# Patient Record
(demographics unavailable — no encounter records)

---

## 2025-05-21 NOTE — HISTORY OF PRESENT ILLNESS
[FreeTextEntry1] : Andra Crandall is a 70 year old female with medical history significant for thyroid disease presenting today referred by PCP.  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 Labs 4/28/25 - HbA1c 6.0% H. B12 966, TSH 3.03, T4 1.3, folate 6.3, vitamin D 35.8  Her father passed away from ruptured brain aneurysm at 53 years old.  She was with him when he passed from the.  They were also told by the medical providers at the hospital that he had multiple aneurysms in different vessels in the head and neck.  She intermittently gets instances of slurred speech, very sporadic, typically only happens at work or if she is in a high stress situations such as public speaking.  Otherwise she does not get any speech abnormalities at home or in comfortable situations.  She denies any focal weakness, numbness or tingling, vertigo, droopy face, difficulty with gait or balance, or other strokelike symptoms.  She never smoked tobacco.  She did grow up in a household where she was exposed to a lot of secondhand smoke.  She is up-to-date with all cancer screenings, within normal limits.

## 2025-05-21 NOTE — PHYSICAL EXAM
[FreeTextEntry1] : NEURO: Mental Status Oriented to person, place, time, and situation Speech is clear, fluent, and spontaneous. Comprehension and memory intact.   Cranial Nerves Visual fields full to confrontation Pupils equal, round, reactive to light. Extraocular movements intact. No nystagmus or ptosis. Facial sensation intact and symmetric in V1, V2, V3 Facial movement intact and symmetric Uvula midline, soft palate elevates normally Bilateral shoulder shrug intact Tongue midline, no tongue bite sign or deviation on protrusion   Motor Tone and bulk intact Shoulder abduction: 5/5 b/l Elbow flexion/extension: 5/5 bl Hand : 5/5 b/l Hip flexion/extension: 5/5 b/l Knee flexion/extension: 5/5 b/l Dorsiflexion/plantar flexion: 5/5 b/l No pronator drift   Sensation Light touch grossly intact   Deep Tendon Reflexes Biceps 2+ b/l Patellar 2+ b/l   Coordination Normal finger to nose bilaterally No past pointing   Gait Normal stance, stride, and pivot turn    GEN: awake, alert, interactive, no acute distress EYES: sclera white, conjunctiva clear, no redness or discharge ENT: normal appearing outer ears, hearing grossly intact PULM: normal respiratory rhythm and effort, speaking in full sentences without distress, no accessory muscle usage EXT: moving all extremities spontaneously, no edema, no cyanosis SKIN: warm, dry

## 2025-05-21 NOTE — ASSESSMENT
[FreeTextEntry1] : 70 year old female with medical history significant for thyroid disease presenting today referred by PCP. FMHx of ruptured intracranial aneurysm - father passed away at 54 y/o. She has intermittent slurring of words with stressful situations/public speaking.  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 Labs 4/28/25 - HbA1c 6.0% H. B12 966, TSH 3.03, T4 1.3, folate 6.3, vitamin D 35.8 No focal neuro deficit on exam.  Discussed with pt that white matter changes on MRI are WNL for her age. She should control risk factors including BP, cholesterol, high blood sugars/preDM. She says today's BP readings in office 130-140s/80s are higher than her normal, I encouraged pt to get OTC BP cuff, check 2-3x daily and PRN with symptoms, keep journal and f/u with PCP or cardiology for management. We will get MRA H/N to evaluate for aneurysm or other vessel abnormalities.   Patient to return to office after testing, or sooner if needed. Patient understands to seek urgent medical evaluation for any new or worsening symptoms.

## 2025-06-18 NOTE — PHYSICAL EXAM
[FreeTextEntry1] : NEURO: Mental Status Oriented to person, place, time, and situation Speech is clear, fluent, and spontaneous. Comprehension and memory intact   Cranial Nerves Visual fields full to confrontation Pupils equal, round. Extraocular movements intact. No nystagmus or ptosis. Facial movement intact and symmetric Uvula midline, soft palate elevates normally Bilateral shoulder shrug intact Tongue midline, no tongue bite sign or deviation on protrusion   Motor Tone and bulk intact Shoulder abduction: 5/5 b/l Elbow flexion/extension: 5/5 bl Hand : 5/5 b/l Hip flexion/extension: 5/5 b/l Knee flexion/extension: 5/5 b/l Dorsiflexion/plantar flexion: 5/5 b/l No pronator drift   Sensation Light touch grossly intact No extinction     Coordination Normal finger to nose bilaterally No past pointing No tremor appreciated.   Gait Normal stance, stride, and pivot turn Tandem walk intact Heel and toe gait intact. Negative Romberg.     GEN: awake, alert, interactive, no acute distress EYES: sclera white, conjunctiva clear, no redness or discharge ENT: normal appearing outer ears, hearing grossly intact PULM: normal respiratory rhythm and effort, speaking in full sentences without distress, no accessory muscle usage EXT: moving all extremities spontaneously, no edema, no cyanosis SKIN: warm, dry, no lesions on visualized skin

## 2025-06-18 NOTE — HISTORY OF PRESENT ILLNESS
[FreeTextEntry1] : Andra Crandall is a 70 year old female with medical history significant for thyroid disease, FMHx of ruptured intracranial aneurysm presenting today referred by AIDEE Lyles.   She feels well, denies any neurological symptoms at present time.   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 There is no len aneurysm. MRA neck 5/27/25 - Normal MRA of the neck, no stenosis. Discussed results with pt. She would like evaluation with neurovascular specialist - she is concerned about these findings given her family history of ruptured aneurysms. We arrange for consultation with Dr. Patel. She would also like to bring BP machine and journal/readings. All questions answered to best of my ability.   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 Labs 4/28/25 - HbA1c 6.0% H. B12 966, TSH 3.03, T4 1.3, folate 6.3, vitamin D 35.8 Her father passed away from ruptured brain aneurysm at 53 years old. She was with him when he passed from the. They were also told by the medical providers at the hospital that he had multiple aneurysms in different vessels in the head and neck. She intermittently gets instances of slurred speech, very sporadic, typically only happens at work or if she is in a high stress situations such as public speaking. Otherwise she does not get any speech abnormalities at home or in comfortable situations. She denies any focal weakness, numbness or tingling, vertigo, droopy face, difficulty with gait or balance, or other strokelike symptoms. She never smoked tobacco. She did grow up in a household where she was exposed to a lot of secondhand smoke. She is up-to-date with all cancer screenings, within normal limits.

## 2025-06-18 NOTE — REVIEW OF SYSTEMS
[As Noted in HPI] : as noted in HPI [FreeTextEntry2] :  10 systems reviewed, as documented in HPI or negative.

## 2025-06-18 NOTE — ASSESSMENT
[FreeTextEntry1] : 70 year old female with medical history significant for thyroid disease, FMHx of ruptured intracranial aneurysm presenting today referred by AIDEE Lyles.   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 There is no len aneurysm. MRA neck 5/27/25 - Normal MRA of the neck, no stenosis. 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 Labs 4/28/25 - HbA1c 6.0% H. B12 966, TSH 3.03, T4 1.3, folate 6.3, vitamin D 35.8  Discussed with pt that vessel imaging was negative for aneurysm. 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   Patient to return to office as needed. Patient understands to seek urgent medical evaluation for any new or worsening symptoms.